# Patient Record
Sex: FEMALE | Race: WHITE | NOT HISPANIC OR LATINO
[De-identification: names, ages, dates, MRNs, and addresses within clinical notes are randomized per-mention and may not be internally consistent; named-entity substitution may affect disease eponyms.]

---

## 2018-11-20 ENCOUNTER — APPOINTMENT (OUTPATIENT)
Dept: OTOLARYNGOLOGY | Facility: CLINIC | Age: 68
End: 2018-11-20

## 2019-09-10 ENCOUNTER — APPOINTMENT (OUTPATIENT)
Dept: OTOLARYNGOLOGY | Facility: CLINIC | Age: 69
End: 2019-09-10
Payer: MEDICARE

## 2019-09-10 VITALS
WEIGHT: 121 LBS | SYSTOLIC BLOOD PRESSURE: 117 MMHG | BODY MASS INDEX: 22.26 KG/M2 | HEIGHT: 62 IN | DIASTOLIC BLOOD PRESSURE: 77 MMHG | HEART RATE: 67 BPM

## 2019-09-10 PROCEDURE — 99214 OFFICE O/P EST MOD 30 MIN: CPT

## 2019-09-10 NOTE — PHYSICAL EXAM
[de-identified] : soft, no masses/lesions, full mobility [de-identified] : soft and symmetric, no palpable masses/lesions [Midline] : trachea located in midline position [de-identified] : right parotid duct patent, but with turbid, thicker expressed debris; left parotidf duct patent w clear saliva [Normal] : no rashes

## 2019-09-10 NOTE — HISTORY OF PRESENT ILLNESS
[de-identified] : 68F here for evaluation.\par \par Roughly 3-4 weeks ago, she developed increased mucus and congestion which was then followed by painful swelling over the right side of her face. The painful swelling has since slowly dissipated and she is now nearly 98% back to normal. The pain and swelling would get worse when she eats. She did not take any abx for this.\par This had happened to her once in the past many year ago.\par She has no other complaints today.\par She had been seen back in 2016 for benign left level three lymph node which has since resolved.\par \par ROS otherwise unremarkable.

## 2019-09-10 NOTE — ASSESSMENT
[FreeTextEntry1] : 68F here for evaluation. Roughly 3-4 weeks ago, she developed increased mucus and congestion which was then followed by painful swelling over the right side of her face. The pain and swelling would get worse when she eats, but her sx have since slowly dissipated and she is now nearly 98% back to normal. She did not take any abx for this. This had happened to her once in the past many year ago. On exam, the right parotid duct is patent, but with turbid, thick saliva expressed. There are no grossly palpable stones and the neck/parotid is flat.\par No doubt she is getting over an episode of acute right parotitis, and symptomatically she is markedly improved. There is still thick debris expressed from the right parotid duct. This is the second such episode she has had.\par At this point, I will proceed w CT neck - this can show any stones and/or lesion. RTO 1 month to review imaging, or sooner should anything occur in the interim.

## 2019-09-10 NOTE — CONSULT LETTER
[Dear  ___] : Dear  [unfilled], [Courtesy Letter:] : I had the pleasure of seeing your patient, [unfilled], in my office today. [Consult Closing:] : Thank you very much for allowing me to participate in the care of this patient.  If you have any questions, please do not hesitate to contact me. [Sincerely,] : Sincerely, [London Pate MD] : London Pate MD  [Department of Otolaryngology, Head and Neck Surgery] : Department of Otolaryngology, Head and Neck Surgery [[facility]] : [facility]

## 2019-10-08 ENCOUNTER — FORM ENCOUNTER (OUTPATIENT)
Age: 69
End: 2019-10-08

## 2019-10-09 ENCOUNTER — OUTPATIENT (OUTPATIENT)
Dept: OUTPATIENT SERVICES | Facility: HOSPITAL | Age: 69
LOS: 1 days | End: 2019-10-09
Payer: MEDICARE

## 2019-10-09 ENCOUNTER — APPOINTMENT (OUTPATIENT)
Dept: CT IMAGING | Facility: HOSPITAL | Age: 69
End: 2019-10-09
Payer: MEDICARE

## 2019-10-09 PROCEDURE — 70491 CT SOFT TISSUE NECK W/DYE: CPT

## 2019-10-09 PROCEDURE — 70491 CT SOFT TISSUE NECK W/DYE: CPT | Mod: 26

## 2019-10-31 ENCOUNTER — APPOINTMENT (OUTPATIENT)
Dept: OTOLARYNGOLOGY | Facility: CLINIC | Age: 69
End: 2019-10-31
Payer: MEDICARE

## 2019-10-31 VITALS
BODY MASS INDEX: 22.26 KG/M2 | DIASTOLIC BLOOD PRESSURE: 78 MMHG | SYSTOLIC BLOOD PRESSURE: 126 MMHG | WEIGHT: 121 LBS | HEART RATE: 80 BPM | HEIGHT: 62 IN

## 2019-10-31 DIAGNOSIS — R59.9 ENLARGED LYMPH NODES, UNSPECIFIED: ICD-10-CM

## 2019-10-31 DIAGNOSIS — K11.20 SIALOADENITIS, UNSPECIFIED: ICD-10-CM

## 2019-10-31 PROCEDURE — 99214 OFFICE O/P EST MOD 30 MIN: CPT

## 2019-10-31 NOTE — ASSESSMENT
[FreeTextEntry1] : 68F here in followup. She feels well since last seen 6 weeks ago in the setting of resolving right parotitis. She is here to review imaging.\par CT neck shows no evidence of right parotid mass or obstructing calculus, but shows a diffusely abnormal appearance to the major salivary glands and the lacrimal glands suggesting underlying chronic sialoadenitis.\par Complete and comprehensive head and neck exam today is unremarkable, with patent salivary ducts and a soft flat neck.\par Her recent episode of right parotitis remains resolved.\par Her CT suggests an underlying chronic sialoadenitis c/w granulomatous disease or Sjogren's, but she endorses no such known history with only one prior episode of sialoadenitis requiring treatment in the past. \par Regarding the laryngeal findings of the CT as above, prior in office laryngoscopy was normal and she has no complaints with regard to her voice.\par As such, RTO as needed should anything develop in the future. Pt reassured.

## 2019-10-31 NOTE — HISTORY OF PRESENT ILLNESS
[de-identified] : 68F here in followup.\par \par She feels well since last seen 6 weeks ago. She is here to review imaging.\par \par CT Neck 10/9/19:\par -While there is no evidence of right parotid mass or obstructing calculus, there is a diffusely abnormal appearance to the major salivary glands and the lacrimal glands suggesting underlying chronic sialoadenitis such as Sjogren's or granulomatous disease.\par -There are 2 prominent lymph nodes in left level 3 that are nonspecific in appearance.\par -There is asymmetry of the larynx suggesting possible right vocal cord dysfunction\par \par From prior note-\par Roughly 3-4 weeks ago, she developed increased mucus and congestion which was then followed by painful swelling over the right side of her face. The painful swelling has since slowly dissipated and she is now nearly 98% back to normal. The pain and swelling would get worse when she eats. She did not take any abx for this.\par This had happened to her once in the past many year ago.\par She has no other complaints today.\par She had been seen back in 2016 for benign left level three lymph node which has since resolved.\par \par ROS otherwise unremarkable.

## 2019-10-31 NOTE — PHYSICAL EXAM
[de-identified] : soft, no masses/lesions, full mobility [de-identified] : soft and symmetric, no palpable masses/lesions [Midline] : trachea located in midline position [de-identified] : salivary ducts patent [Normal] : no rashes

## 2019-10-31 NOTE — DATA REVIEWED
[No studies available for review at this time] : No studies available for review at this time [de-identified] : CT Neck:\par Findings: \par \par * Aerodigestive Tract: There is asymmetric fullness of the right laryngeal \par ventricle and piriform sinus, as well as sclerosis of the right arytenoid \par process; please with any history of voice change determine the need for \par further investigation of this finding. \par \par * Lymph Nodes: There is enlargement of 2 lymph nodes in left level 3 lying \par anterior and posterior to the internal jugular vein, each measuring \par approximately 2 cm in maximal dimension and demonstrating predominantly \par homogeneous contrast enhancement. \par \par * Salivary Glands: There is mild enlargement and multi nodularity of the \par bilateral parotid, submandibular and sublingual glands suggesting underlying \par chronic sialoadenitis such as Sjogren's or granulomatous disease. While \par there is mild prominence of the intraglandular ductal system, this \par appearance is present symmetrically bilaterally. An obstructing mass or \par calculus is not identified. \par \par * Thyroid Gland: Normal. \par \par * Orbits: The lacrimal glands demonstrate similar multinodular area. \par \par * Brain: Visualized portions are grossly unremarkable. \par \par * Skull Base: Intact. \par \par * Paranasal Sinuses: Fluid is noted to layer in the left sphenoid sinus. \par \par * Mastoids: Clear. \par \par * Cervical Spine: Multilevel degenerative change. \par \par * Lung Apices: No large apical lung mass. \par \par \par IMPRESSION: \par \par While there is no evidence of right parotid mass or obstructing calculus, \par there is a diffusely abnormal appearance to the major salivary glands and \par the lacrimal glands suggesting underlying chronic sialoadenitis such as \par Sjogren's or granulomatous disease. There are 2 prominent lymph nodes in \par left level 3 that are nonspecific in appearance. There is asymmetry of the \par larynx suggesting possible right vocal cord dysfunction; please correlate \par with any voice change determine the need for endoscopic evaluation

## 2019-10-31 NOTE — CONSULT LETTER
[Dear  ___] : Dear  [unfilled], [Courtesy Letter:] : I had the pleasure of seeing your patient, [unfilled], in my office today. [Consult Closing:] : Thank you very much for allowing me to participate in the care of this patient.  If you have any questions, please do not hesitate to contact me. [Sincerely,] : Sincerely, [London Pate MD] : London Pate MD  [Department of Otolaryngology, Head and Neck Surgery] : Department of Otolaryngology, Head and Neck Surgery [[facility]] : [facility]

## 2019-12-04 ENCOUNTER — APPOINTMENT (OUTPATIENT)
Dept: CT IMAGING | Facility: HOSPITAL | Age: 69
End: 2019-12-04

## 2019-12-06 ENCOUNTER — APPOINTMENT (OUTPATIENT)
Dept: CT IMAGING | Facility: HOSPITAL | Age: 69
End: 2019-12-06

## 2020-09-24 ENCOUNTER — APPOINTMENT (OUTPATIENT)
Dept: OTOLARYNGOLOGY | Facility: CLINIC | Age: 70
End: 2020-09-24